# Patient Record
Sex: MALE | Race: WHITE | URBAN - METROPOLITAN AREA
[De-identification: names, ages, dates, MRNs, and addresses within clinical notes are randomized per-mention and may not be internally consistent; named-entity substitution may affect disease eponyms.]

---

## 2020-12-08 ENCOUNTER — APPOINTMENT (OUTPATIENT)
Dept: URBAN - METROPOLITAN AREA CLINIC 231 | Age: 65
Setting detail: DERMATOLOGY
End: 2020-12-08

## 2020-12-08 DIAGNOSIS — L30.9 DERMATITIS, UNSPECIFIED: ICD-10-CM

## 2020-12-08 PROCEDURE — OTHER COUNSELING: OTHER

## 2020-12-08 PROCEDURE — OTHER PRESCRIPTION: OTHER

## 2020-12-08 PROCEDURE — 99202 OFFICE O/P NEW SF 15 MIN: CPT

## 2020-12-08 PROCEDURE — OTHER MEDICATION COUNSELING: OTHER

## 2020-12-08 PROCEDURE — OTHER MIPS QUALITY: OTHER

## 2020-12-08 RX ORDER — KETOCONAZOLE 20 MG/G
CREAM TOPICAL
Qty: 30 | Refills: 0 | Status: ERX | COMMUNITY
Start: 2020-12-08

## 2020-12-08 RX ORDER — DESONIDE 0.5 MG/G
CREAM TOPICAL BID
Qty: 60 | Refills: 0 | Status: ERX | COMMUNITY
Start: 2020-12-08

## 2020-12-08 ASSESSMENT — LOCATION ZONE DERM: LOCATION ZONE: FACE

## 2020-12-08 ASSESSMENT — LOCATION SIMPLE DESCRIPTION DERM: LOCATION SIMPLE: LEFT CHEEK

## 2020-12-08 ASSESSMENT — LOCATION DETAILED DESCRIPTION DERM: LOCATION DETAILED: LEFT SUPERIOR CENTRAL MALAR CHEEK

## 2020-12-08 NOTE — PROCEDURE: MEDICATION COUNSELING
shortness of breath Arava Counseling:  Patient counseled regarding adverse effects of Arava including but not limited to nausea, vomiting, abnormalities in liver function tests. Patients may develop mouth sores, rash, diarrhea, and abnormalities in blood counts. The patient understands that monitoring is required including LFTs and blood counts.  There is a rare possibility of scarring of the liver and lung problems that can occur when taking methotrexate. Persistent nausea, loss of appetite, pale stools, dark urine, cough, and shortness of breath should be reported immediately. Patient advised to discontinue Arava treatment and consult with a physician prior to attempting conception. The patient will have to undergo a treatment to eliminate Arava from the body prior to conception.

## 2020-12-08 NOTE — PROCEDURE: MEDICATION COUNSELING
Xeljannethz Pregnancy And Lactation Text: This medication is Pregnancy Category D and is not considered safe during pregnancy.  The risk during breast feeding is also uncertain.

## 2021-01-04 ENCOUNTER — RX ONLY (RX ONLY)
Age: 66
End: 2021-01-04

## 2021-01-04 ENCOUNTER — APPOINTMENT (OUTPATIENT)
Dept: URBAN - METROPOLITAN AREA CLINIC 231 | Age: 66
Setting detail: DERMATOLOGY
End: 2021-01-05

## 2021-01-04 DIAGNOSIS — L259 CONTACT DERMATITIS AND OTHER ECZEMA, UNSPECIFIED CAUSE: ICD-10-CM

## 2021-01-04 PROBLEM — L23.9 ALLERGIC CONTACT DERMATITIS, UNSPECIFIED CAUSE: Status: ACTIVE | Noted: 2021-01-04

## 2021-01-04 PROCEDURE — OTHER OTHER: OTHER

## 2021-01-04 PROCEDURE — OTHER MIPS QUALITY: OTHER

## 2021-01-04 PROCEDURE — OTHER PRESCRIPTION: OTHER

## 2021-01-04 PROCEDURE — OTHER TREATMENT REGIMEN: OTHER

## 2021-01-04 PROCEDURE — 99214 OFFICE O/P EST MOD 30 MIN: CPT

## 2021-01-04 RX ORDER — HYDROXYZINE HYDROCHLORIDE 10 MG/1
TABLET, FILM COATED ORAL
Qty: 60 | Refills: 0 | Status: ERX | COMMUNITY
Start: 2021-01-04

## 2021-01-04 RX ORDER — TRIAMCINOLONE ACETONIDE 0.25 MG/G
OINTMENT TOPICAL
Qty: 15 | Refills: 0 | Status: ERX | COMMUNITY
Start: 2021-01-04

## 2021-01-04 RX ORDER — TACROLIMUS 1 MG/G
OINTMENT TOPICAL BID
Qty: 30 | Refills: 0 | Status: ERX

## 2021-01-04 RX ORDER — TACROLIMUS 1 MG/G
OINTMENT TOPICAL BID
Qty: 100 | Refills: 0 | Status: ERX | COMMUNITY
Start: 2021-01-04

## 2021-01-04 NOTE — PROCEDURE: TREATMENT REGIMEN
Plan: Discontinue Ketoconazole and Desonide today. \\n\\nPurchase OTC- Allergy Allegra 180 mg, take one tablet in the morning, and one tablet at night.\\n\\nCetaphil lotion and Vanicream samples x 1 given to patient today. \\n\\nDHS shampoo sample x1 given to patient today- use as a body wash for neck and face.
Detail Level: Zone

## 2021-01-04 NOTE — PROCEDURE: OTHER
Render Risk Assessment In Note?: no
Other (Free Text): Consider underlying Seborrheic dermatitis or actinic dermatitis
Detail Level: Zone
Note Text (......Xxx Chief Complaint.): This diagnosis correlates with the
Other (Free Text): treatment of underlying actinic dermatitis needs to be delayed until contact dermatitis is cleared.

## 2021-01-19 ENCOUNTER — APPOINTMENT (OUTPATIENT)
Dept: URBAN - METROPOLITAN AREA CLINIC 231 | Age: 66
Setting detail: DERMATOLOGY
End: 2021-01-22

## 2021-01-19 DIAGNOSIS — L21.8 OTHER SEBORRHEIC DERMATITIS: ICD-10-CM

## 2021-01-19 DIAGNOSIS — L23.9 ALLERGIC CONTACT DERMATITIS, UNSPECIFIED CAUSE: ICD-10-CM

## 2021-01-19 DIAGNOSIS — L57.0 ACTINIC KERATOSIS: ICD-10-CM

## 2021-01-19 PROCEDURE — OTHER PRESCRIPTION: OTHER

## 2021-01-19 PROCEDURE — OTHER DEFER: OTHER

## 2021-01-19 PROCEDURE — 99214 OFFICE O/P EST MOD 30 MIN: CPT

## 2021-01-19 PROCEDURE — OTHER COUNSELING: OTHER

## 2021-01-19 RX ORDER — TACROLIMUS 1 MG/G
OINTMENT TOPICAL BID
Qty: 60 | Refills: 0 | Status: ERX

## 2021-01-19 RX ORDER — BETAMETHASONE DIPROPIONATE 0.5 MG/G
CREAM TOPICAL
Qty: 15 | Refills: 0 | Status: ERX | COMMUNITY
Start: 2021-01-19

## 2021-01-19 RX ORDER — SODIUM SULFACETAMIDE 100 MG/ML
LIQUID TOPICAL
Qty: 354 | Refills: 0 | Status: ERX | COMMUNITY
Start: 2021-01-19

## 2021-01-19 ASSESSMENT — LOCATION DETAILED DESCRIPTION DERM
LOCATION DETAILED: LEFT CYMBA CONCHA
LOCATION DETAILED: RIGHT INFERIOR CENTRAL MALAR CHEEK
LOCATION DETAILED: LEFT CENTRAL MALAR CHEEK
LOCATION DETAILED: RIGHT CYMBA CONCHA

## 2021-01-19 ASSESSMENT — LOCATION ZONE DERM
LOCATION ZONE: EAR
LOCATION ZONE: FACE

## 2021-01-19 ASSESSMENT — LOCATION SIMPLE DESCRIPTION DERM
LOCATION SIMPLE: LEFT CHEEK
LOCATION SIMPLE: RIGHT EAR
LOCATION SIMPLE: RIGHT CHEEK
LOCATION SIMPLE: LEFT EAR

## 2021-01-19 NOTE — PROCEDURE: DEFER
Detail Level: Detailed
Instructions (Optional): PDT Blue
Introduction Text (Please End With A Colon): The following procedure was deferred:

## 2021-03-05 ENCOUNTER — APPOINTMENT (OUTPATIENT)
Dept: URBAN - METROPOLITAN AREA CLINIC 231 | Age: 66
Setting detail: DERMATOLOGY
End: 2021-03-14

## 2021-03-05 DIAGNOSIS — L57.0 ACTINIC KERATOSIS: ICD-10-CM

## 2021-03-05 PROCEDURE — 96567 PDT DSTR PRMLG LES SKN: CPT

## 2021-03-05 PROCEDURE — OTHER COUNSELING: OTHER

## 2021-03-05 PROCEDURE — OTHER PDT: BLUE: OTHER

## 2021-03-05 ASSESSMENT — LOCATION DETAILED DESCRIPTION DERM: LOCATION DETAILED: RIGHT INFERIOR CENTRAL MALAR CHEEK

## 2021-03-05 ASSESSMENT — LOCATION ZONE DERM: LOCATION ZONE: FACE

## 2021-03-05 ASSESSMENT — LOCATION SIMPLE DESCRIPTION DERM: LOCATION SIMPLE: RIGHT CHEEK

## 2021-04-08 ENCOUNTER — APPOINTMENT (OUTPATIENT)
Dept: URBAN - METROPOLITAN AREA CLINIC 231 | Age: 66
Setting detail: DERMATOLOGY
End: 2021-04-12

## 2021-04-08 DIAGNOSIS — L71.8 OTHER ROSACEA: ICD-10-CM

## 2021-04-08 DIAGNOSIS — L57.0 ACTINIC KERATOSIS: ICD-10-CM

## 2021-04-08 DIAGNOSIS — L21.8 OTHER SEBORRHEIC DERMATITIS: ICD-10-CM

## 2021-04-08 DIAGNOSIS — L23.9 ALLERGIC CONTACT DERMATITIS, UNSPECIFIED CAUSE: ICD-10-CM

## 2021-04-08 PROCEDURE — OTHER DEFER: OTHER

## 2021-04-08 PROCEDURE — OTHER TREATMENT REGIMEN: OTHER

## 2021-04-08 PROCEDURE — 99214 OFFICE O/P EST MOD 30 MIN: CPT

## 2021-04-08 PROCEDURE — OTHER PRESCRIPTION: OTHER

## 2021-04-08 PROCEDURE — OTHER COUNSELING: OTHER

## 2021-04-08 RX ORDER — DOXYCYCLINE HYCLATE 20 MG/1
TABLET, FILM COATED ORAL
Qty: 60 | Refills: 2 | Status: ERX | COMMUNITY
Start: 2021-04-08

## 2021-04-08 RX ORDER — TRIAMCINOLONE ACETONIDE 1 MG/G
CREAM TOPICAL
Qty: 30 | Refills: 0 | Status: ERX | COMMUNITY
Start: 2021-04-08

## 2021-04-08 ASSESSMENT — LOCATION DETAILED DESCRIPTION DERM
LOCATION DETAILED: LEFT CENTRAL MALAR CHEEK
LOCATION DETAILED: LEFT CYMBA CONCHA
LOCATION DETAILED: RIGHT CYMBA CONCHA
LOCATION DETAILED: LEFT INFERIOR CENTRAL MALAR CHEEK

## 2021-04-08 ASSESSMENT — LOCATION SIMPLE DESCRIPTION DERM
LOCATION SIMPLE: LEFT EAR
LOCATION SIMPLE: RIGHT EAR
LOCATION SIMPLE: LEFT CHEEK

## 2021-04-08 ASSESSMENT — LOCATION ZONE DERM
LOCATION ZONE: EAR
LOCATION ZONE: FACE

## 2021-04-08 NOTE — PROCEDURE: TREATMENT REGIMEN
Plan: Cetaphil moisturizer to face daily
Detail Level: Zone
Plan: Protopic 0.1% ointment - apply to face 2 x daily \\nSulfa cleanser 10% - use to wash face 2 x daily

## 2021-04-21 NOTE — PROCEDURE: MEDICATION COUNSELING
orthostatic hypotension  vasovagal episodes, syncope  palpitations  left shoulder replacement  LBP, spinal stenosis  gastritis Humira Counseling:  I discussed with the patient the risks of adalimumab including but not limited to myelosuppression, immunosuppression, autoimmune hepatitis, demyelinating diseases, lymphoma, and serious infections.  The patient understands that monitoring is required including a PPD at baseline and must alert us or the primary physician if symptoms of infection or other concerning signs are noted.

## 2021-05-04 NOTE — PROCEDURE: COUNSELING
Central Prior Authorization Team  Phone: [phone]    PA Initiation    Medication: Differin .1% cream  Insurance Company: [employer] - Phone [phone] Fax [phone]  Pharmacy Filling the Rx: [id number] - [place], [place] - [address] AT [facility]  Filling Pharmacy Phone: [phone]  Filling Pharmacy Fax:    Start Date: 5/4/2021         Detail Level: Zone

## 2021-10-01 NOTE — PROCEDURE: MEDICATION COUNSELING
Mart-1 - Negative Histology Text: MART-1 staining demonstrates a normal density and pattern of melanocytes along the dermal-epidermal junction. The surgical margins are negative for tumor cells. Minoxidil Counseling: Minoxidil is a topical medication which can increase blood flow where it is applied. It is uncertain how this medication increases hair growth. Side effects are uncommon and include stinging and allergic reactions.

## 2022-07-28 NOTE — PROCEDURE: MEDICATION COUNSELING
Pt feeling nauseated with abdominal discomfort. Dr. Wegener notified. Awaiting new orders.   Itraconazole Counseling:  I discussed with the patient the risks of itraconazole including but not limited to liver damage, nausea/vomiting, neuropathy, and severe allergy.  The patient understands that this medication is best absorbed when taken with acidic beverages such as non-diet cola or ginger ale.  The patient understands that monitoring is required including baseline LFTs and repeat LFTs at intervals.  The patient understands that they are to contact us or the primary physician if concerning signs are noted.

## 2022-12-12 NOTE — PROCEDURE: MEDICATION COUNSELING
Negative Elidel Counseling: Patient may experience a mild burning sensation during topical application. Elidel is not approved in children less than 2 years of age. There have been case reports of hematologic and skin malignancies in patients using topical calcineurin inhibitors although causality is questionable.

## 2024-02-26 NOTE — PROCEDURE: MEDICATION COUNSELING
February 26, 2024      [facility]  [address] [facility] [place] [address]  Phone: [phone]  Fax: [phone]       Patient: Estrellita Victor   YOB: 1991  Date of Visit: 02/26/2024    To Whom It May Concern:    [place] Victor  was at [facility] on 02/26/2024. The patient may return to work/school on 02/27/24 with no restrictions. If you have any questions or concerns, or if I can be of further assistance, please do not hesitate to contact me.    Sincerely,    WILI Sher      Thalidomide Pregnancy And Lactation Text: This medication is Pregnancy Category X and is absolutely contraindicated during pregnancy. It is unknown if it is excreted in breast milk.

## 2024-06-23 NOTE — PROCEDURE: MEDICATION COUNSELING
Follow-up with primary care doctor.    Benzoyl Peroxide Counseling: Patient counseled that medicine may cause skin irritation and bleach clothing.  In the event of skin irritation, the patient was advised to reduce the amount of the drug applied or use it less frequently.   The patient verbalized understanding of the proper use and possible adverse effects of benzoyl peroxide.  All of the patient's questions and concerns were addressed.